# Patient Record
Sex: FEMALE | Race: WHITE
[De-identification: names, ages, dates, MRNs, and addresses within clinical notes are randomized per-mention and may not be internally consistent; named-entity substitution may affect disease eponyms.]

---

## 2017-02-19 ENCOUNTER — HOSPITAL ENCOUNTER (EMERGENCY)
Dept: HOSPITAL 25 - UCEAST | Age: 57
Discharge: HOME | End: 2017-02-19
Payer: COMMERCIAL

## 2017-02-19 VITALS — SYSTOLIC BLOOD PRESSURE: 137 MMHG | DIASTOLIC BLOOD PRESSURE: 95 MMHG

## 2017-02-19 DIAGNOSIS — J20.9: Primary | ICD-10-CM

## 2017-02-19 PROCEDURE — 93005 ELECTROCARDIOGRAM TRACING: CPT

## 2017-02-19 PROCEDURE — 99212 OFFICE O/P EST SF 10 MIN: CPT

## 2017-02-19 PROCEDURE — G0463 HOSPITAL OUTPT CLINIC VISIT: HCPCS

## 2017-02-19 PROCEDURE — 71020: CPT

## 2017-02-19 NOTE — UC
Respiratory Complaint HPI





- HPI Summary


HPI Summary: 





55 yo female ill for about 5 days


fever/chills


mid scapular thoraic pain worse with cough or deep breat





hx of pneumonia





hx of xr c/w copd





- History of Current Complaint


Chief Complaint: UCGeneralIllness


Stated Complaint: COUGH


Time Seen by Provider: 02/19/17 12:26


Hx Obtained From: Patient


Onset/Duration: Gradual Onset, Lasting Days


Severity Initially: Moderate


Severity Currently: Moderate


Pain Intensity: 4


Pain Scale Used: 0-10 Numeric


Character: Cough: Productive


Associated Signs And Symptoms: Positive: Fever, Chills, Nasal Congestion, Sinus 

Discomfort.  Negative: Pleuritic Chest Pain





- Allergies/Home Medications


Allergies/Adverse Reactions: 


 Allergies











Allergy/AdvReac Type Severity Reaction Status Date / Time


 


Garlic Allergy  GI Upset Verified 04/20/16 15:27














PMH/Surg Hx/FS Hx/Imm Hx


Previously Healthy: Yes


Endocrine History Of: 


   Denies: Diabetes


Cardiovascular History Of: 


   Denies: Hypertension, Pacemaker/ICD


Respiratory History Of: Reports: COPD, Bronchitis, Pneumonia


GI/ History Of: 


   Denies: Renal Disease


Cancer History Of: 


   Denies: Breast Cancer





- Surgical History


Surgical History: Yes


Surgery Procedure, Year, and Place: PARTIAL HYSTERECTOMY,.  RETINA OPEN REPAIR 

WITH LASER.  LUMPECTOMY -Lt BREAST - BENIGN.  LYME DX





- Family History


Known Family History: Positive: Respiratory Disease





- Social History


Alcohol Use: Daily


Substance Use Type: None


Smoking Status (MU): Never Smoked Tobacco


Have You Smoked in the Last Year: No





Review of Systems


Constitutional: Fever, Chills


Skin: Negative


Eyes: Negative


ENT: Nasal Discharge


Respiratory: Cough


Cardiovascular: Negative


Gastrointestinal: Negative


Genitourinary: Negative


Motor: Negative


Neurovascular: Negative


Musculoskeletal: Negative


Neurological: Negative


Psychological: Negative


All Other Systems Reviewed And Are Negative: Yes





Physical Exam


Triage Information Reviewed: Yes


Appearance: Well-Appearing, No Pain Distress, Well-Nourished


Vital Signs: 


 Initial Vital Signs











Temp  99.6 F   02/19/17 12:18


 


Pulse  87   02/19/17 12:18


 


Resp  16   02/19/17 12:18


 


BP  137/95   02/19/17 12:18


 


Pulse Ox  98   02/19/17 12:18











Vital Signs Reviewed: Yes


Eyes: Positive: Conjunctiva Clear


ENT: Positive: Hearing grossly normal, Pharynx normal.  Negative: Pharyngeal 

erythema, Nasal drainage, Tonsillar swelling, Tonsillar exudate, Trismus, 

Muffled/hoarse voice


Dental Exam: Normal


Neck: Positive: Supple, Nontender, No Lymphadenopathy


Respiratory: Positive: Lungs clear, Normal breath sounds, No respiratory 

distress


Cardiovascular: Positive: RRR, No Murmur.  Negative: Tachycardia, Bradycardia


Abdomen Description: Positive: Nontender, Soft.  Negative: Bruit


Bowel Sounds: Positive: Present


Neurological Exam: Normal


Neurological: Positive: Muscle Tone Normal


Psychological Exam: Normal


Skin Exam: Normal





UC Diagnostic Evaluation





- Laboratory


O2 Sat by Pulse Oximetry: 98 - normal/not hypoxic





- EKG


Cardiac Rate: NL


Cardiac Rhythm: Sinus: Normal


Ectopy: None


ST Segment: Normal





Respiratory Course/Dx





- Differential Dx/Diagnosis


Provider Diagnoses: acute bronchitis





Discharge





- Discharge Plan


Condition: Stable


Disposition: HOME


Prescriptions: 


Azithromycin TAB* [Zithromax TAB*] 250 mg PO DAILY #6 tab


Patient Education Materials:  Acute Bronchitis (ED)


Referrals: 


Tulio Ramos MD [Primary Care Provider] - 5 Days (if not better)

## 2017-02-19 NOTE — RAD
HISTORY: Fever, productive cough



COMPARISONS: January 18, 2015



VIEWS: 2: Frontal dual-energy and lateral views of the chest.



FINDINGS:

CARDIOMEDIASTINAL SILHOUETTE: The cardiomediastinal silhouette is normal.

CATHY: The cathy are normal.

PLEURA: The costophrenic angles are sharp. No pleural abnormalities are noted.

LUNG PARENCHYMA: There is hyperinflation with flattening of the diaphragm and expansion of

the AP diameter of the chest.

ABDOMEN: The upper abdomen is clear. There is no subphrenic gas.

BONES AND SOFT TISSUES: No bone or soft tissue abnormalities are noted.

OTHER: None.



IMPRESSION:

HYPERINFLATION, CONSISTENT WITH COPD. NO ACTIVE CARDIOPULMONARY DISEASE.

## 2018-10-27 ENCOUNTER — HOSPITAL ENCOUNTER (EMERGENCY)
Dept: HOSPITAL 25 - ED | Age: 58
Discharge: HOME | End: 2018-10-27
Payer: COMMERCIAL

## 2018-10-27 VITALS — DIASTOLIC BLOOD PRESSURE: 87 MMHG | SYSTOLIC BLOOD PRESSURE: 128 MMHG

## 2018-10-27 DIAGNOSIS — M25.562: Primary | ICD-10-CM

## 2018-10-27 DIAGNOSIS — S80.212A: ICD-10-CM
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
## 2018-10-27 DIAGNOSIS — S80.02XA: ICD-10-CM

## 2018-10-27 DIAGNOSIS — J44.9: ICD-10-CM

## 2018-10-27 PROCEDURE — 99282 EMERGENCY DEPT VISIT SF MDM: CPT

## 2018-10-27 NOTE — XMS REPORT
Continuity of Care Document (CCD)

 Created on:October 3, 2018



Patient:Pauline Romero

Sex:Female

:1960

External Reference #:[id number]





Demographics







 Address  [address] [place] [address]

 

 Home Phone  [phone]

 

 Mobile Phone  [phone]

 

 Work Phone  [phone]

 

 Preferred Language  en

 

 Marital Status  Not  or 

 

 Muslim Affiliation  Unknown

 

 Race  White

 

 Ethnic Group  Not  or 









Author







 Name  Elodia Victor O.D.

 

 Address  [address]



   Unavailable



   [place] [address]









Support







 Name  Relationship  Address  Phone

 

 Jaret Nova  [address]  [phone]



     [place] [address]  









Care Team Providers







 Name  Role  Phone

 

 Tulio Ramos M.D.  Primary Care Physician  Unavailable









Payers







 Type  Date  Identification Numbers  Payment Provider  Subscriber

 

     Policy Number: [id number]  [place] Plan  Reg [employer]









 PayID: [id number]  [address]









 [place] [address]







Advance Directives







 Description

 

 No Information Available







Problems







 Date  Description  Provider  Status

 

 Onset:   Allergic rhinitis    Active

 

 Onset: 10/26/2015  Conjunctival hemorrhage  Elodia Victor O.D.  Active

 

 Onset: 2016  Degenerative progressive high  Elodia Victor O.D.  
Active



   myopia    

 

 Onset: 2016  Vitreous degeneration  Elodia Victor O.D.  Active

 

 Onset: 2016  Nuclear senile cataract  Elodia Victor O.D.  Active

 

 Onset: 2018  Pinguecula  Elodia Victor O.D.  Active

 

 Onset: 10/26/2015  Lyme disease  Elodia Victor O.D.  Resolved









 Resolved: 2018







Family History







 Date  Family Member(s)  Problem(s)  Comments

 

   Father  Cataract  

 

   Father  Macular Degeneration  

 

   Mother  No Current Problems  







Social History







 Type  Date  Description  Comments

 

 Birth Sex    Unknown  

 

 Marital Status    Legal Status:   

 

 Occupation    Writer  

 

 Occupation      

 

 Work Status    Full-Time Employment  

 

 ETOH Use    Occasionally consumes beer  

 

 ETOH Use    Occasionally consumes liquor  

 

 ETOH Use    Occasionally consumes wine  

 

 Tobacco Use  Start: Unknown  Patient has never smoked  

 

 Recreational Drug Use    Denies Drug Use  

 

 Smoking Status  Reviewed: 10/03/18  Patient has never smoked  







Allergies, Adverse Reactions, Alerts







 Description

 

 No Known Drug Allergies







Medications







 Medication  Date  Status  Form  Strength  Qnty  SIG  Indications  Ordering



                 Provider

 

 Saline  10/02/2  Active  Solution      as needed    Elodia J.



   018              BONNIE Victor

 

 Rewetting  10/25/2  Active  Solution      as needed    Elodia J.



 Drops  015              BONNIE Victor

 

 Zyrtec Allergy    Active  Tablets  10mg    1 every    Unknown



   000          day    

 

 Probiotic    Active  Tablets      1 tab po    Unknown



 Acidophilus  000          daily    



 Super Strength                

 

                 

 

 Tobramycin-Dex    Hx  Suspension  0.3-0.1%  10ml  1 drop  H10.811  
Elodia MOURA



 amethasone  018 -          4xday    Kayleigh



             right eye    O.DAVID



   018              







Immunizations







 Description

 

 No Information Available







Vital Signs







 Date  Vital  Result  Comment

 

 10/26/2015  1:30pm  BP Systolic  121 mmHg  









 BP Diastolic  82 mmHg  

 

 Heart Rate  82 /min  







Results







 Description

 

 No Information Available







Procedures







 Date  Code  Description  Status

 

 2018  27575  Est Patient Intermediate Exam  Completed

 

 2018  603  Eyeglass/CL Case  Completed

 

 2017  34014  Determination Of Refractive State  Completed

 

 2017  18596  Est Patient Comprehensive Exam  Completed

 

 2016  63426  Scanning Computerized Opthalmic Diagnostic Posterior Seg  
Completed



     Retina  

 

 2016  59160  Determination Of Refractive State  Completed

 

 2016  25038  Est Patient Comprehensive Exam  Completed

 

 10/26/2015  52590  Est Patient Intermediate Exam  Completed

 

 2014  33284  Scanning Computerized Opthalmic Diagnostic Posterior Seg  
Completed



     Retina  

 

 2014  25083  Determination Of Refractive State  Completed

 

 2014  11964  Est Patient Comprehensive Exam  Completed

 

 2014  101  Level 1 SCL Fit/Refit  Completed

 

 10/24/2013  57567  Est Patient Comprehensive Exam  Completed

 

 10/24/2013  201  Refit - No Change In Fit  Completed

 

 2012  201  Refit - No Change In Fit  Completed

 

 2012  20053  Est Patient Comprehensive Exam  Completed

 

 2011  59400  Determination Of Refractive State  Completed

 

 2011  41525  Est Patient Comprehensive Exam  Completed

 

 2011  201  Refit - No Change In Fit  Completed

 

 10/05/2009  87306  Est Patient Comprehensive Exam  Completed

 

 10/05/2009  201  Refit - No Change In Fit  Completed

 

 2008  67243  Est Patient Comprehensive Exam  Completed

 

 2008  201  Refit - No Change In Fit  Completed

 

 06/10/2005  201  Refit - No Change In Fit  Completed

 

 2005  67394  Rescheduled Appointment  Completed

 

 2005  11402  Determination Of Refractive State  Completed

 

 2005  95667  Est Patient Comprehensive Exam  Completed

 

 2004  14621  Destruction Lesion Retina, Photocoagulation  Completed

 

 2004  35779  New Patient Comprehensive Exam  Completed







Encounters







 Type  Date  Location  Provider  Dx  Diagnosis

 

 Office Visit  2018  Elodia Carr  H10.811  Pingueculitis,



   4:00p  MD, philip Victor O.D.    right eye

 

 Office Visit  2018  Elodia Carr0.811  Pinghislaineecubarbara,



   2:20p  philip KING O.D.    right eye

 

 Office Visit  2018  Elodia Carr  H10.811  Pingueculitis,



   11:45a  philip KING O.D.    right eye







Plan of Treatment

10/03/2018 - Elodia Victor O.D.H44.23 Degenerative myopia, 
bilateralComments:Smoking can increase the risk of developing or worsening any 
eye related disease, as well as affect your overall health.  If you are a smoker
, we strongly recommend that you quit.If you are not a smoker, we strongly 
recommend that you do not start.Follow up:1 Year Follow Up OCT MAC / cl fit  
You can expect to have your eyes dilated at your next visit. If Dr. Victor 
orders any additional testing, it may require extra time. We recommend that you 
bring sunglasses, as dilation drops often make you light sensitive until they 
wear off. We always recommend you bring someone to drive you home if you are 
uncomfortable driving with your eyes dilated. If you have any questions before 
your next visit, feel free to call our office at [phone].h25.13 Age-
related nuclear cataract, bilateralComments:You have been diagnosed with 
cataracts. If you are happy with your vision as it is now, then we willsee you 
at your next scheduled appointment. If you feel like your vision is getting 
worse before your scheduled appointment, please call Raeann or Malissa at [phone]
[phone].H11.153 miguel angel Rodriguez

## 2018-10-27 NOTE — ED
Lower Extremity





- HPI Summary


HPI Summary: 


Patient is a 59 y/o F w/ c/o left knee pain and abrasion to the left knee 

onsetting last night. She states that she tripped over some uneven sidewalk, 

fell, and hit her left knee. Patient reports that she experiences pain only 

with certain movements. Pain is described as sharp. She also notes difficulty 

standing on her left leg, standing that her knee terrell when she does so. In 

the room, she notes ice and lying down alleviates pain. Patient is UTD on 

tetanus shot. No other complaints reported. On triage, pain is denied, weight 

bearing, flexion, ambulation is noted to aggravate Sx, nothing is reported to 

alleviate. Home medications and allergies are reviewed. 








- History of Current Complaint


Chief Complaint: EDExtremityLower


Stated Complaint: LT KNEE INJURY


Time Seen by Provider: 10/27/18 13:24


Hx Obtained From: Patient


Mechanism Of Injury: Fall From A Standing Position


Onset of Pain: Immediate, Days - onset last night, Prior to Arrival


Onset/Duration: Days - last night


Severity Currently: None - pain is denied on triage


Pain Intensity: 0


Pain Scale Used: 0-10 Numeric - 0/10


Timing: Intermittent - depending on movement and position


Location: Is Discrete @ - left knee


Character Of Pain: Sharp


Associated Signs And Symptoms: Positive: Knee Pain, Other - left knee abrasion


Aggravating Factor(s): Ambulation, Movement - flexion, Weight Bearing


Alleviating Factor(s): Rest - lying flat, Ice





- Allergies/Home Medications


Allergies/Adverse Reactions: 


 Allergies











Allergy/AdvReac Type Severity Reaction Status Date / Time


 


garlic Allergy Severe GI Upset Verified 10/27/18 12:47














PMH/Surg Hx/FS Hx/Imm Hx


Endocrine/Hematology History: 


   Denies: Hx Diabetes


Cardiovascular History: 


   Denies: Hx Hypertension, Hx Pacemaker/ICD


Respiratory History: Reports: Hx Chronic Obstructive Pulmonary Disease (COPD), 

Hx Pneumonia


 History: 


   Denies: Hx Renal Disease


Sensory History: 


   Denies: Hx Hearing Aid


Psychiatric History: 


   Denies: Hx Panic Disorder





- Surgical History


Surgery Procedure, Year, and Place: PARTIAL HYSTERECTOMY,.  RETINA OPEN REPAIR 

WITH LASER.  LUMPECTOMY -Lt BREAST - BENIGN.  LYME DX


Infectious Disease History: No


Infectious Disease History: 


   Denies: Traveled Outside the [place] in Last 30 Days





- Family History


Known Family History: Positive: Respiratory Disease





- Social History


Alcohol Use: Daily


Substance Use Type: Reports: None


Smoking Status (MU): Never Smoked Tobacco


Have You Smoked in the Last Year: No





Review of Systems


Positive: Other - left knee pain with certain movements/positions 


Positive: Other - left knee abrasion 


All Other Systems Reviewed And Are Negative: Yes





Physical Exam





- Summary


Physical Exam Summary: 





Appearance: Well appearing, no pain distress


Skin: warm, dry, reflects adequate perfusion


Head/face: normal


Eyes: EOMI, BOBBY


ENT: normal


Neck: supple, non-tender


Respiratory: CTA, breath sounds present


Cardiovascular: RRR, pulses symmetrical  


Abdomen: non-tender, soft


Bowel: present


Musculoskeletal:  tenderness over left knee, abrasion over anterior aspect of 

left knee, no neurovascular deficits 


Neuro: normal, sensory motor intact, A&Ox3








Triage Information Reviewed: Yes


Vital Signs On Initial Exam: 


 Initial Vitals











Temp Pulse Resp BP Pulse Ox


 


 98.0 F   72   14   169/85   100 


 


 10/27/18 12:42  10/27/18 12:42  10/27/18 12:42  10/27/18 12:42  10/27/18 12:42











Vital Signs Reviewed: Yes





Diagnostics





- Vital Signs


 Vital Signs











  Temp Pulse Resp BP Pulse Ox


 


 10/27/18 12:42  98.0 F  72  14  169/85  100














- Laboratory


Lab Statement: Any lab studies that have been ordered have been reviewed, and 

results considered in the medical decision making process.





- Radiology


  ** left knee x-ray


Radiology Interpretation Completed By: Radiologist


Summary of Radiographic Findings: IMPRESSION:  NO EVIDENCE FOR FRACTURE. IF THE 

PATIENT'S SYMPTOMS PERSIST RECOMMEND.  FOLLOW-UP IMAGING.  THIS REPORT WAS 

REVIEWED BY ED PHYSICIAN.





Re-Evaluation





- Re-Evaluation


  ** First Eval


Re-Evaluation Time: 13:52


Comment: Results of X-ray were discussed with patient. She will follow up with 

orthopedic doctor in three days. Patient is agreeable with this plan.





Lower Extremity Course/Dx





- Course


Course Of Treatment: Patient is a 59 y/o F w/ c/o left knee pain and abrasion 

to the left knee onsetting last night. She states that she tripped over some 

uneven sidewalk, fell, and hit her left knee. Patient reports that she 

experiences pain only with certain movements. Pain is described as sharp. She 

also notes difficulty standing on her left leg, standing that her knee terrell 

when she does so. Patient is UTD on tetanus shot.  On physical exam, there is 

tenderness over the left knee, abrasion at anterior aspect of left knee, no 

neurovascular deficits are noted. No other abnormal findings on physical exam. 

During ED course, patient received Motrin 600 mg. LEFT KNEE X-RAY IMPRESSION:  

NO EVIDENCE FOR FRACTURE. IF THE PATIENT'S SYMPTOMS PERSIST RECOMMEND.  FOLLOW-

UP IMAGING. Results of X-ray were discussed with patient. She will follow up 

with orthopedic doctor in three days. Patient is agreeable with this plan. Dx 

of left knee pain, contusion, abrasion.





- Diagnoses


Differential Diagnosis/HQI/PQRI: Positive: Contusion, Fracture (Closed), Strain


Provider Diagnoses: 


 Left knee pain, Abrasion of left knee, Contusion of left knee








Discharge





- Sign-Out/Discharge


Documenting (check all that apply): Patient Departure - DISCHARGE 





- Discharge Plan


Condition: Stable


Disposition: HOME


Patient Education Materials:  Contusion in Adults (ED), Abrasion (ED), Knee 

Pain (ED)


Referrals: 


Yamil Srinivasan MD [Medical Doctor] - 3 Days


Additional Instructions: 


FOLLOW UP WITH ORTHOPEDIC DOCTOR IN THREE DAYS. RETURN TO ED FOR ANY NEW OR 

WORSENING SYMPTOMS. 





- Billing Disposition and Condition


Condition: STABLE


Disposition: Home





- Attestation Statements


Document Initiated by Scribe: Yes


Documenting Scribe: Dk Hamilton 


Provider For Whom Zara is Documenting (Include Credential): Kory Pink MD


Scribe Attestation: 


Dk SCOTT , scribed for Kory Pink MD on 10/27/18 at 1459. 


Scribe Documentation Reviewed: Yes


Provider Attestation: 


The documentation as recorded by the Dk jennings  accurately reflects 

the service I personally performed and the decisions made by Kory recinos MD

## 2018-10-27 NOTE — RAD
INDICATION: Left knee injury.



TECHNIQUE: 4 views of the left knee were obtained.



FINDINGS:  There is anterior soft tissue swelling. The bones are normal alignment. No

joint effusion or fracture is seen.  Joint spaces appear maintained.



IMPRESSION:  NO EVIDENCE FOR FRACTURE. IF THE PATIENT'S SYMPTOMS PERSIST RECOMMEND

FOLLOW-UP IMAGING.

## 2019-07-09 ENCOUNTER — HOSPITAL ENCOUNTER (EMERGENCY)
Dept: HOSPITAL 25 - UCEAST | Age: 59
Discharge: HOME | End: 2019-07-09
Payer: COMMERCIAL

## 2019-07-09 VITALS — SYSTOLIC BLOOD PRESSURE: 141 MMHG | DIASTOLIC BLOOD PRESSURE: 86 MMHG
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
## 2019-07-09 DIAGNOSIS — J44.9: ICD-10-CM

## 2019-07-09 DIAGNOSIS — S22.31XA: Primary | ICD-10-CM
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
## 2019-07-09 PROCEDURE — 99211 OFF/OP EST MAY X REQ PHY/QHP: CPT

## 2019-07-09 PROCEDURE — G0463 HOSPITAL OUTPT CLINIC VISIT: HCPCS

## 2019-07-09 NOTE — UC
Truncal Trauma HPI





- HPI Summary


HPI Summary: 


PATIENT MISSED THE LAST STEP OF A STAIRCASE AND STRUCK HER RIGHT ANTERIOR RIB 

CAGE ON THE METAL HANDRAIL.  HAD IMMEDIATE PAIN THAT HAS BEEN PERSISTENT.  

WORSE WITH MOVEMENT, COUGHING, DEEP BREATHS.  NO FEVER OR SHORTNESS OF BREATH.





- History Of Current Complaint


Chief Complaint: UCTrauma


Stated Complaint: R RIB INJURY


Time Seen by Provider: 07/09/19 12:47


Hx Obtained From: Patient


Onset/Duration: Sudden Onset, Lasting Days, Still Present


Onset Of Pain: Immediate


Severity Initially: Moderate


Severity Currently: Moderate


Pain Intensity: 1


Pain Scale Used: 0-10 Numeric


Mechanism Of Injury: Blunt Trauma


Aggravating Factor(s): Movement, Deep Breathing, Cough


Alleviating factor(s): Rest


Associated Signs And Symptoms: Negative: SOB, Chest Pain, Cough, Hematuria, 

Fever, Nausea





- Allergies/Home Medications


Allergies/Adverse Reactions: 


 Allergies











Allergy/AdvReac Type Severity Reaction Status Date / Time


 


garlic AdvReac Severe GI Upset Verified 07/09/19 12:01











Home Medications: 


 Home Medications





Ibuprofen TAB* [Advil TAB*] 400 mg PO ONCE PRN 07/09/19 [History Confirmed 07/09 /19]











PMH/Surg Hx/FS Hx/Imm Hx


Respiratory History: COPD





- Surgical History


Surgical History: Yes


Surgery Procedure, Year, and Place: PARTIAL HYSTERECTOMY,.  RETINA OPEN REPAIR 

WITH LASER.  LUMPECTOMY -Lt BREAST - BENIGN





- Family History


Known Family History: Positive: Respiratory Disease





- Social History


Alcohol Use: Daily


Alcohol Amount: 2


Substance Use Type: None


Smoking Status (MU): Never Smoked Tobacco


Have You Smoked in the Last Year: No





Review of Systems


All Other Systems Reviewed And Are Negative: Yes


Constitutional: Positive: Negative


Skin: Positive: Negative


Respiratory: Positive: Negative


Cardiovascular: Positive: Negative


Gastrointestinal: Positive: Negative


Musculoskeletal: Positive: Arthralgia





Physical Exam


Triage Information Reviewed: Yes


Appearance: Well-Appearing, No Pain Distress, Well-Nourished


Vital Signs: 


 Initial Vital Signs











Temp  97.8 F   07/09/19 11:54


 


Pulse  66   07/09/19 11:54


 


Resp  16   07/09/19 11:54


 


BP  141/86   07/09/19 11:54


 


Pulse Ox  100   07/09/19 11:54











Vital Signs Reviewed: Yes


Eyes: Positive: Conjunctiva Clear


ENT: Positive: Hearing grossly normal


Neck: Positive: Supple


Respiratory Exam: Normal


Cardiovascular Exam: Normal


Abdomen Description: Positive: Soft


Musculoskeletal: Positive: Other: - TTP RIGHT ANTERIOR RIB CAGE


Neurological: Positive: Alert


Psychological: Positive: Age Appropriate Behavior


Skin: Negative: Rashes





Diagnostics





- Radiology


  ** RIGHT RIB XRAYS


Radiology Interpretation Completed By: Radiologist


Summary of Radiographic Findings: QUESTIONABLE FRACTURES OF THE ANTERIOR 

ASPECTS OF THE RIGHT FOURTH AND FIFTH RIBS WITHOUT APPRECIABLE PNEUMOTHORAX.





Truncal Trauma Course/Dx





- Course


Course Of Treatment: 





X-RAY SHOWS QUESTIONABLE FRACTURES OF RIGHT FOURTH AND FIFTH RIBS.  ENCOURAGED 

PATIENT TO TAKE DEEP BREATHS MULTIPLE TIMES DAILY TO KEEP HER LUNGS EXPANDED.  

PATIENT DECLINES INCENTIVE SPIROMETER TODAY.  OTC MEDICATIONS AS NEEDED FOR 

DISCOMFORT.  FOLLOW-UP WITH PCP.  GO TO THE ER WITHOUT FAIL IF SYMPTOMS WORSEN.





- Differential Dx/Diagnosis


Provider Diagnosis: 


 Right rib fracture








Discharge





- Sign-Out/Discharge


Documenting (check all that apply): Patient Departure


All imaging exams completed and their final reports reviewed: Yes





- Discharge Plan


Condition: Stable


Disposition: HOME


Patient Education Materials:  Rib Fracture (ED)


Referrals: 


Tulio Ramos MD [Primary Care Provider] - If Needed


Additional Instructions: 


XRAY TODAY SHOWS QUESTIONABLE FRACTURES OF THE ANTERIOR ASPECTS OF THE RIGHT 

FOURTH AND FIFTH RIBS. 





RIB INJURIES AND FRACTURES:


     You have been diagnosed as having either bruised or broken ribs. These two 

injuries are treated in the same way.  It will usually take four to six weeks 

for these injured ribs to heal.


     Sometimes, rib belts or anesthetic injections of the chest wall help 

reduce the pain. You should cough or take a deep breath at least every hour or 

two to prevent lung complications.


     You should not engage in any strenuous physical activity until released by 

your physician.  The usual rule is "if it hurts, don't do it."


     Rib fractures can lead to serious lung complications including lung 

collapse, hemorrhage, and pneumonia.  You should go to the ED if any of the 

following occur: 


(1) Fever or chills. 


(2) Persistent cough, coughing up blood, or shortness of breath. 


(3) Increasing pain. 


(4) Weakness, lightheadedness, or fainting.





Be sure to take slow deep breaths several times daily to help keep your lungs 

expanded.





- Billing Disposition and Condition


Condition: STABLE


Disposition: Home